# Patient Record
Sex: FEMALE | Race: ASIAN | NOT HISPANIC OR LATINO | Employment: FULL TIME | URBAN - METROPOLITAN AREA
[De-identification: names, ages, dates, MRNs, and addresses within clinical notes are randomized per-mention and may not be internally consistent; named-entity substitution may affect disease eponyms.]

---

## 2023-03-20 NOTE — PROGRESS NOTES
Assessment/Plan:  Calcium 1000 mg + 600-1000 IU Vit D daily  Pap with high risk HPV Annual mammogram, monthly breast self exam    Vulvitis ? Lichens vs yeast/dermatitis RX lotrisone cream bid x 2 weeks then daily x 2 weeks  F/u 1 month for repeat eval possible bx  Vaginal erythema/friability? Atrophy pain with speculum insertion  Diflucan 1 now repeat in 3 days   Re evaluate in 1 month ? Vaginal estrogen        Diagnoses and all orders for this visit:    Encounter for gynecological examination without abnormal finding  -     IGP,CtNg,AptimaHPV,rfx16/18,45    Encounter for screening mammogram for malignant neoplasm of breast  -     Mammo screening bilateral w 3d & cad; Future    Encounter for Papanicolaou smear for cervical cancer screening  -     IGP,CtNg,AptimaHPV,rfx16/18,45    Acute vulvitis  -     clotrimazole-betamethasone (LOTRISONE) 1-0 05 % cream; Twice a day for 2 weeks then daily for 2 weeks    Acute vaginitis  -     fluconazole (DIFLUCAN) 150 mg tablet; Take 1 tablet (150 mg total) by mouth once for 1 dose Repeat in 3 days    Screen for STD (sexually transmitted disease)  -     IGP,CtNg,AptimaHPV,rfx16/18,45    Other orders  -     Liquid-based pap, screening  -     multivitamin (THERAGRAN) TABS; Take 1 tablet by mouth daily          Subjective:      Patient ID: Chapito Nelson is a 53 y o  female  New former pt here for annual gyn Last seen   Last pap 10/2017 neg/neg No h/o abn pap  Has had several issues Painful intercourse the past few years  As well as PCB  She saw [facility] ob gyn last year as thought they were our practice  They did US and EMB she reports were normal US  obtained from [facility]  Uterus normal size inhomogenous echotexture no discrete fibroid  EMS 14 mm Ovaries WNL  They also performed vaginal swabs nuswab and mycoplasma culture per pt neg  No results available for review She notes vaginal dryness  Infrequently SA due to pain    Periods spaced out  LMP 2022  Did have light period/spotting last week  No sex after normal period In Dec   She went to [facility] in Dec and while there developed severe  vulvar itching and burning She denies any discharge  Got worse so tried some Lotrimin without improvement  She had seen PCP in Nov for [facility] blood work done and all normal  She c/o hairloss  Had been under a lot of stress Her dad passed away and other family issues  She  started on a prescription minoxidal   Initially she developed bumps on scalp She continued to use and developed inflammatory response  Swelling of scalp face eyes  She followed up with PCP and was given steroids  She then dev severe migraine vomiting, felt confused went to [address] [place] [address]  Feb 2023 CT head neg  CBC, Chem 12 and TSH all normal Given fluids and IV fluids and meds ( Toradol, Benadryl and Reglan) and felt better  She feels since then things are really off like she is having something autoimmune  She did see  Her PCP again end of Feb for stress reaction and dysthymia They prescribed Zoloft but she has not started as feels she is doing ok  who performed blood work  Will request those results   She had last colonoscopy 2016 for hemorrhoidal bleeding  The following portions of the patient's history were reviewed and updated as appropriate: allergies, current medications, past family history, past medical history, past social history, past surgical history and problem list     Review of Systems   Constitutional: Negative for fatigue and unexpected weight change  Gastrointestinal: Negative for abdominal pain, constipation and diarrhea  Genitourinary: Positive for dyspareunia, menstrual problem, vaginal bleeding and vaginal pain  Negative for difficulty urinating, dysuria, frequency and hematuria          Vulvar itching and burning          Objective:      /72   Ht 5' 4 5" (1 638 m)   Wt 65 8 kg (145 lb)   LMP 12/26/2022 (Approximate) Comment: no coitus since December  Breastfeeding No   BMI 24 50 kg/m² Physical Exam  Vitals and nursing note reviewed  Constitutional:       General: She is not in acute distress  Appearance: Normal appearance  HENT:      Head: Normocephalic and atraumatic  Cardiovascular:      Rate and Rhythm: Normal rate and regular rhythm  Pulmonary:      Effort: Pulmonary effort is normal       Breath sounds: Normal breath sounds  Chest:   Breasts:     Breasts are symmetrical       Right: Normal  No mass, nipple discharge, skin change or tenderness  Left: Normal  No mass, nipple discharge, skin change or tenderness  Abdominal:      General: There is no distension  Palpations: Abdomen is soft  Tenderness: There is no abdominal tenderness  There is no guarding or rebound  Genitourinary:     General: Normal vulva  Exam position: Lithotomy position  Labia:         Right: Rash present  No tenderness, lesion or injury  Left: Rash present  No tenderness, lesion or injury  Urethra: No prolapse, urethral pain, urethral swelling or urethral lesion  Vagina: Erythema and bleeding present  No lesions  Cervix: Friability and cervical bleeding present  No cervical motion tenderness, discharge or lesion  Uterus: Normal        Adnexa: Right adnexa normal and left adnexa normal         Right: No mass or tenderness  Left: No mass or tenderness  Rectum: No mass or external hemorrhoid  Comments: Whitening of skin vulva mons to perianal  Loss of labia minora ? Lichens vs dermatitis  Vagina inflamed/friable/bleeding with exam  Atrophic PAP from cervix A lot of bleeding   Musculoskeletal:         General: Normal range of motion  Lymphadenopathy:      Upper Body:      Right upper body: No axillary adenopathy  Left upper body: No axillary adenopathy  Lower Body: No right inguinal adenopathy  No left inguinal adenopathy  Skin:     General: Skin is warm and dry     Neurological:      Mental Status: She is alert and oriented to person, place, and time  Psychiatric:         Mood and Affect: Mood normal          Behavior: Behavior normal          Thought Content:  Thought content normal          Judgment: Judgment normal

## 2023-03-20 NOTE — PATIENT INSTRUCTIONS
Calcium 1000 mg + 600-1000 IU Vit D daily  Pap with high risk HPV Annual mammogram, monthly breast self exam    Vulvitis ? Lichens vs yeast/dermatitis RX lotrisone cream bid x 2 weeks then daily x 2 weeks  F/u 1 month for repeat eval possible bx  Vaginal erythema/friability? Atrophy pain with speculum insertion  Diflucan 1 now repeat in 3 days   Re evaluate in 1 month ?  Vaginal estrogen

## 2023-03-21 ENCOUNTER — OFFICE VISIT (OUTPATIENT)
Dept: OBGYN CLINIC | Facility: CLINIC | Age: 48
End: 2023-03-21

## 2023-03-21 ENCOUNTER — TELEPHONE (OUTPATIENT)
Dept: OBGYN CLINIC | Facility: CLINIC | Age: 48
End: 2023-03-21

## 2023-03-21 VITALS
SYSTOLIC BLOOD PRESSURE: 110 MMHG | HEIGHT: 65 IN | DIASTOLIC BLOOD PRESSURE: 72 MMHG | WEIGHT: 145 LBS | BODY MASS INDEX: 24.16 KG/M2

## 2023-03-21 DIAGNOSIS — N76.0 ACUTE VAGINITIS: ICD-10-CM

## 2023-03-21 DIAGNOSIS — Z12.31 ENCOUNTER FOR SCREENING MAMMOGRAM FOR MALIGNANT NEOPLASM OF BREAST: ICD-10-CM

## 2023-03-21 DIAGNOSIS — Z01.419 ENCOUNTER FOR GYNECOLOGICAL EXAMINATION WITHOUT ABNORMAL FINDING: Primary | ICD-10-CM

## 2023-03-21 DIAGNOSIS — Z12.4 ENCOUNTER FOR PAPANICOLAOU SMEAR FOR CERVICAL CANCER SCREENING: ICD-10-CM

## 2023-03-21 DIAGNOSIS — Z11.3 SCREEN FOR STD (SEXUALLY TRANSMITTED DISEASE): ICD-10-CM

## 2023-03-21 DIAGNOSIS — N76.2 ACUTE VULVITIS: ICD-10-CM

## 2023-03-21 RX ORDER — CLOTRIMAZOLE AND BETAMETHASONE DIPROPIONATE 10; .64 MG/G; MG/G
CREAM TOPICAL
Qty: 45 G | Refills: 1 | Status: SHIPPED | OUTPATIENT
Start: 2023-03-21

## 2023-03-21 RX ORDER — FLUCONAZOLE 150 MG/1
150 TABLET ORAL ONCE
Qty: 1 TABLET | Refills: 0 | Status: SHIPPED | OUTPATIENT
Start: 2023-03-21 | End: 2023-03-21

## 2023-03-21 RX ORDER — DIPHENOXYLATE HYDROCHLORIDE AND ATROPINE SULFATE 2.5; .025 MG/1; MG/1
1 TABLET ORAL DAILY
COMMUNITY

## 2023-03-21 NOTE — TELEPHONE ENCOUNTER
Neville chester she was in the office today and told she would have 2 doses of fluconazole  She picked up her Rx and only one pill was provided   Please advise

## 2023-03-21 NOTE — TELEPHONE ENCOUNTER
Contacted [facility] pharmacist who states she can add the new order as a verbal but cannot add to prior rx since it has been picked up   See additional patient portal message

## 2023-03-26 LAB
C TRACH RRNA CVX QL NAA+PROBE: NEGATIVE
CYTOLOGIST CVX/VAG CYTO: NORMAL
DX ICD CODE: NORMAL
HPV GENOTYPE REFLEX: NORMAL
HPV I/H RISK 4 DNA CVX QL PROBE+SIG AMP: NEGATIVE
N GONORRHOEA RRNA CVX QL NAA+PROBE: NEGATIVE
OTHER STN SPEC: NORMAL
PATH REPORT.FINAL DX SPEC: NORMAL
SL AMB NOTE:: NORMAL
SL AMB SPECIMEN ADEQUACY: NORMAL
SL AMB TEST METHODOLOGY: NORMAL

## 2023-04-20 ENCOUNTER — OFFICE VISIT (OUTPATIENT)
Dept: OBGYN CLINIC | Facility: CLINIC | Age: 48
End: 2023-04-20

## 2023-04-20 VITALS
BODY MASS INDEX: 25.54 KG/M2 | SYSTOLIC BLOOD PRESSURE: 110 MMHG | DIASTOLIC BLOOD PRESSURE: 70 MMHG | HEIGHT: 64 IN | WEIGHT: 149.6 LBS

## 2023-04-20 DIAGNOSIS — R93.89 THICKENED ENDOMETRIUM: Primary | ICD-10-CM

## 2023-04-20 DIAGNOSIS — N95.2 VAGINAL ATROPHY: ICD-10-CM

## 2023-04-20 DIAGNOSIS — N76.2 ACUTE VULVITIS: ICD-10-CM

## 2023-04-20 RX ORDER — ESTRADIOL 0.1 MG/G
1 CREAM VAGINAL DAILY
Qty: 42.5 G | Refills: 2 | Status: SHIPPED | OUTPATIENT
Start: 2023-04-20

## 2023-04-20 RX ORDER — FLUCONAZOLE 150 MG/1
TABLET ORAL
Qty: 6 TABLET | Refills: 0 | Status: SHIPPED | OUTPATIENT
Start: 2023-04-20 | End: 2023-06-01

## 2023-04-20 RX ORDER — CLOBETASOL PROPIONATE 0.46 MG/ML
SOLUTION TOPICAL
COMMUNITY
Start: 2023-01-12

## 2023-04-20 NOTE — PROGRESS NOTES
Assessment/Plan:  Vulvitis improving continue diflucan 1 tab weekly  Dyspareunia/friable vagina Start Vaginal estrace daily x 14 days then three times per week   F/u 2-3 months   Call with worsening sx     Diagnoses and all orders for this visit:    Thickened endometrium  Comments:  thickened endomet on US last year 14 mm  Having infrequent period will recheck as starting vaginal estrogen   Orders:  -     US pelvis complete w transvaginal; Future    Acute vulvitis  Comments:  improving continue weekly diflucan x 6  Orders:  -     fluconazole (DIFLUCAN) 150 mg tablet; Weekly for 6 weeks    Vaginal atrophy  Comments:  dyspareunia for years friable vagina will start vag estrace  Orders:  -     estradiol (ESTRACE VAGINAL) 0 1 mg/g vaginal cream; Insert 1 g into the vagina daily X 14 days then 3 times per week    Other orders  -     Liquid-based pap, screening  -     clobetasol (TEMOVATE) 0 05 % external solution; APPLY 3X/WEEK TO PSORIASIS ON SCALP AT BEDTIME X 2 WEEKS AS NEEDED FOR FLARES  Subjective:      Patient ID: Alison Albert is a 53 y o  female  Here for f/u to last visit Seen for annual gyn not seen for several years  She had acute vulvitis and  friable vagina with spec insertion  PAP neg/neg No h/o abn Prior pap 2017 neg/neg  She has had very  Painful intercourse the past few years has tried lubricant without improvement Has not had sex in past 6 months  When tries she has a lot of bleeding  She saw [facility] ob gyn last year as thought they were our practice  Due to finding  They did US and EMB she reports were normal US  obtained from [facility]  Uterus normal size inhomogenous echotexture no discrete fibroid  EMS 14 mm Ovaries WNL  They also performed vaginal swabs nuswab and mycoplasma culture for friable vagina all were negative  She notes vaginal dryness  Infrequently SA due to pain  Periods spaced out  LMP 12/26/2022  Did have light period/spotting in March    No sex after her last normal period In Dec  "She went to [facility] in Dec and while there developed severe  vulvar itching and burning She denies any discharge  Got worse so tried some Lotrimin without improvement  She had seen PCP in Nov for [facility] blood work done and all normal  She c/o hairloss  Had been under a lot of stress Her dad passed away and other family issues  She  started on a prescription minoxidal   Initially she developed bumps on scalp She continued to use and developed inflammatory response  Swelling of scalp face eyes  She followed up with PCP and was given steroids  She then dev severe migraine vomiting, felt confused went to [facility]  Feb 2023 CT head neg  CBC, Chem 12 and TSH all normal Given fluids and IV fluids and meds ( Toradol, Benadryl and Reglan) and felt better  She feels since then things are really off like she is having something autoimmune  She did see  Her PCP again end of Feb for stress reaction and dysthymia They prescribed Zoloft but she has not started AT her visit 2 weeks ago she had erythema entire vulva She was given diflucan and lotrisone and states she is feeling much better Her vagina again was noted to be friable pain with speculum insertion       The following portions of the patient's history were reviewed and updated as appropriate: allergies, current medications, past family history, past medical history, past social history, past surgical history and problem list     Review of Systems   Constitutional: Negative for chills and fever  Gastrointestinal: Negative for abdominal pain  Genitourinary: Positive for dyspareunia and menstrual problem  Negative for frequency, pelvic pain, urgency and vaginal discharge  Vulvar itching         Objective:      /70 (BP Location: Left arm, Patient Position: Sitting, Cuff Size: Standard)   Ht 5' 4 25\" (1 632 m)   Wt 67 9 kg (149 lb 9 6 oz)   LMP 12/26/2022 (Exact Date)   BMI 25 48 kg/m²          Physical Exam  Vitals and nursing note reviewed     Constitutional:     " General: She is not in acute distress  Appearance: Normal appearance  HENT:      Head: Normocephalic and atraumatic  Pulmonary:      Effort: Pulmonary effort is normal    Abdominal:      General: There is no distension  Palpations: Abdomen is soft  There is no mass  Tenderness: There is no abdominal tenderness  Genitourinary:     General: Normal vulva  Exam position: Lithotomy position  Labia:         Right: No rash or lesion  Left: No rash or lesion  Vagina: Normal  No vaginal discharge or erythema  Cervix: No cervical motion tenderness, discharge, lesion or cervical bleeding  Uterus: Not enlarged and not tender  Adnexa:         Right: No mass or tenderness  Left: No mass or tenderness  Rectum: Normal       Comments: Less erythema/discoloration Speculum insertion slightly improved less erythema and bleeding   Lymphadenopathy:      Lower Body: No right inguinal adenopathy  No left inguinal adenopathy  Skin:     General: Skin is warm and dry  Neurological:      General: No focal deficit present  Mental Status: She is alert and oriented to person, place, and time  Psychiatric:         Mood and Affect: Mood normal          Behavior: Behavior normal          Thought Content:  Thought content normal

## 2023-04-29 NOTE — PATIENT INSTRUCTIONS
Vulvitis improving continue diflucan 1 tab weekly  Dyspareunia/friable vagina Start Vaginal estrace daily x 14 days then three times per week   F/u 2-3 months   Call with worsening sx

## 2023-05-30 ENCOUNTER — HOSPITAL ENCOUNTER (OUTPATIENT)
Dept: MAMMOGRAPHY | Facility: IMAGING CENTER | Age: 48
Discharge: HOME | End: 2023-05-30

## 2023-05-30 VITALS — BODY MASS INDEX: 24.75 KG/M2 | HEIGHT: 64 IN | WEIGHT: 145 LBS

## 2023-05-30 DIAGNOSIS — Z12.31 ENCOUNTER FOR SCREENING MAMMOGRAM FOR MALIGNANT NEOPLASM OF BREAST: ICD-10-CM

## 2023-08-09 ENCOUNTER — TELEPHONE (OUTPATIENT)
Dept: OBGYN CLINIC | Facility: CLINIC | Age: 48
End: 2023-08-09

## 2023-08-09 NOTE — TELEPHONE ENCOUNTER
Leatrice Klinefelter is having difficulty getting to [facility] for follow up right breast testing. Ewa Sherronsinan wants to get testing done at [facility]. Leatrice Klinefelter will attempt to get right breast testing orders from pt portal. Informed Ewa Sherronsinan to get screening mammogram disc from [facility] to take with her to [facility] testing appt. .Informed to call radiology medical records or main [facility] phone #.. She verbally understood & agreed with plan.

## 2024-08-30 DIAGNOSIS — N76.2 ACUTE VULVITIS: ICD-10-CM

## 2024-08-30 RX ORDER — CLOTRIMAZOLE AND BETAMETHASONE DIPROPIONATE 10; .64 MG/G; MG/G
CREAM TOPICAL
Qty: 45 G | Refills: 1 | Status: SHIPPED | OUTPATIENT
Start: 2024-08-30

## 2024-08-30 NOTE — TELEPHONE ENCOUNTER
Medication: clotrimazole-betamethasone (LOTRISONE) 1-0.05 % cream     Dose/Frequency: Twice a day for 2 weeks then daily for 2 weeks     Quantity: 45 g    Pharmacy: [facility]    Office:   [] PCP/Provider -   [x] Speciality/Provider - LINDA Daugherty     Does the patient have enough for 3 days?   [] Yes   [x] No - Send as HP to POD       Patient asking for refill prescription she had  in August. Patient is set up for yearly visit 24.

## 2024-09-25 ENCOUNTER — ANNUAL EXAM (OUTPATIENT)
Dept: OBGYN CLINIC | Facility: CLINIC | Age: 49
End: 2024-09-25
Payer: COMMERCIAL

## 2024-09-25 VITALS
HEIGHT: 64 IN | SYSTOLIC BLOOD PRESSURE: 126 MMHG | WEIGHT: 151 LBS | DIASTOLIC BLOOD PRESSURE: 74 MMHG | BODY MASS INDEX: 25.78 KG/M2

## 2024-09-25 DIAGNOSIS — R93.89 THICKENED ENDOMETRIUM: ICD-10-CM

## 2024-09-25 DIAGNOSIS — N92.6 IRREGULAR MENSES: ICD-10-CM

## 2024-09-25 DIAGNOSIS — R35.0 URINARY FREQUENCY: ICD-10-CM

## 2024-09-25 DIAGNOSIS — N64.4 BREAST PAIN, LEFT: ICD-10-CM

## 2024-09-25 DIAGNOSIS — R92.8 ABNORMALITY OF RIGHT BREAST ON SCREENING MAMMOGRAM: ICD-10-CM

## 2024-09-25 DIAGNOSIS — Z87.440 HISTORY OF UTI: ICD-10-CM

## 2024-09-25 DIAGNOSIS — N95.2 VAGINAL ATROPHY: ICD-10-CM

## 2024-09-25 DIAGNOSIS — Z01.419 ENCOUNTER FOR GYNECOLOGICAL EXAMINATION WITHOUT ABNORMAL FINDING: Primary | ICD-10-CM

## 2024-09-25 DIAGNOSIS — Z12.31 ENCOUNTER FOR SCREENING MAMMOGRAM FOR MALIGNANT NEOPLASM OF BREAST: ICD-10-CM

## 2024-09-25 PROCEDURE — 99214 OFFICE O/P EST MOD 30 MIN: CPT | Performed by: NURSE PRACTITIONER

## 2024-09-25 PROCEDURE — 99396 PREV VISIT EST AGE 40-64: CPT | Performed by: NURSE PRACTITIONER

## 2024-09-25 RX ORDER — ESTRADIOL 0.1 MG/G
1 CREAM VAGINAL DAILY
Qty: 42.5 G | Refills: 3 | Status: SHIPPED | OUTPATIENT
Start: 2024-09-25

## 2024-09-25 RX ORDER — CEFUROXIME AXETIL 500 MG/1
TABLET ORAL
COMMUNITY
Start: 2024-09-01

## 2024-09-25 RX ORDER — FEXOFENADINE HCL 180 MG/1
180 TABLET ORAL DAILY
COMMUNITY

## 2024-09-25 RX ORDER — FLUCONAZOLE 150 MG/1
TABLET ORAL
COMMUNITY
Start: 2024-09-01

## 2024-09-25 NOTE — PROGRESS NOTES
Assessment/Plan:  Calcium 1000 mg + 600-1000 IU Vit D daily. Pap with high risk HPV Q 5 years   Abnormal mammogram last year for which f/u DX mammo and US recommended Not done  Will get diagnostic bilat mammo now as due for routine mammo and US right breast as prev recommended, monthly breast self exam  Vaginal atrophy/friable vagina/dyspareunia improved from prior stopped vag estrogen now becoming symptomatic again to restart Vag estrace.  Irreg menses thinks went a year without and got again 9/12/2024 Will check labs and f/u US to check endometrial stripe          Diagnoses and all orders for this visit:    Encounter for gynecological examination without abnormal finding    Encounter for screening mammogram for malignant neoplasm of breast  -     Mammo screening bilateral w 3d and cad; Future    Breast pain, left  -     Mammo diagnostic bilateral w 3d and cad; Future  -     US breast left limited (diagnostic); Future    Thickened endometrium  -     US pelvis complete w transvaginal; Future    Abnormality of right breast on screening mammogram  -     Mammo diagnostic bilateral w 3d and cad; Future  -     US breast right limited (diagnostic); Future    Irregular menses  -     US pelvis complete w transvaginal; Future  -     FSH and LH; Future  -     TSH, 3rd generation with Free T4 reflex; Future  -     Prolactin; Future  -     ESTRADIOL, FREE SERUM; Future  -     FSH and LH  -     TSH, 3rd generation with Free T4 reflex  -     Prolactin  -     ESTRADIOL, FREE SERUM    Vaginal atrophy  -     estradiol (ESTRACE VAGINAL) 0.1 mg/g vaginal cream; Insert 1 g into the vagina daily X 14 days then 3 times per week    History of UTI  -     UA/M w/rflx Culture, Routine    Urinary frequency  -     UA/M w/rflx Culture, Routine    Vaginal atrophy  Comments:  dyspareunia for years friable vagina improved with vag estrace  Orders:  -     estradiol (ESTRACE VAGINAL) 0.1 mg/g vaginal cream; Insert 1 g into the vagina daily X 14 days  then 3 times per week    Other orders  -     Multiple Vitamins-Minerals (MULTIVITAMIN ADULT, MINERALS, PO); Take 1 tablet by mouth daily  -     cefuroxime (CEFTIN) 500 mg tablet; take 1 tablet by mouth every 12 hours for 10 days (Patient not taking: Reported on 2024)  -     fexofenadine (ALLEGRA) 180 MG tablet; Take 180 mg by mouth daily  -     fluconazole (DIFLUCAN) 150 mg tablet; TAKE 1 TABLET BY MOUTH ONCE  -     Microscopic Examination          Subjective:      Patient ID: Neville Bender is a 49 y.o. female.     Last pap 3/21/2023 neg/neg HPV prior 10/2017 neg/neg No h/o abn pap. Has had several issues Painful intercourse the past few years. As well as PCB. She saw [facility] ob gyn in  thought they were our practice. They did US and EMB she reports were normal US obtained from [facility]. Uterus normal size inhomogenous echotexture no discrete fibroid. EMS 14 mm Ovaries WNL She was recommended repeat US last year and not done She had friable vaginal and they also performed vaginal swabs nuswab and mycoplasma culture per pt results neg. When she was seen last by me 3/2023 she was prescribed Vaginal estrace which helped However then stopped using and intercourse painful again.  Infrequently SA due to pain. H/o  Irreg periods Periods spaced out LMP 2022 then 3/2023 She did have period 2024 normal period unsure when prior was thinks 1 year.  She had seen PCP in 2022 for [place] blood work done and all normal She c/o hairloss Had been under a lot of stress Her dad passed away and other family issues. She started on a prescription minoxidal. Initially she developed bumps on scalp She continued to use and developed inflammatory response Swelling of scalp face eyes She followed up with PCP and was given steroids. She then dev severe migraine vomiting, felt confused went to [facility] ER.2023 CT head neg. CBC, Chem 12 and TSH all rayo At last visit was recommendedf/u US Prior EMS 14 mm no results States she did not  "get Also had abn mammo 5/30/2023 right breast asymmetry Right breast US and diagnostic mammo ordered no results in chart Did not get. She is c/o persistent left breast pain ? Hormonal She also was treated for UTI recently still having some frequency and urgency unsure if better           The following portions of the patient's history were reviewed and updated as appropriate: allergies, current medications, past family history, past medical history, past social history, past surgical history, and problem list.    Review of Systems   Constitutional:  Negative for fatigue and unexpected weight change.   Gastrointestinal:  Negative for abdominal distention, abdominal pain, constipation and diarrhea.   Genitourinary:  Positive for dyspareunia, frequency, menstrual problem (irregular) and urgency. Negative for difficulty urinating, dysuria, genital sores, pelvic pain, vaginal bleeding, vaginal discharge and vaginal pain.   Neurological:  Negative for headaches.   Psychiatric/Behavioral: Negative.  Negative for dysphoric mood. The patient is not nervous/anxious.          Objective:      /74 (BP Location: Left arm, Patient Position: Sitting, Cuff Size: Standard)   Ht 5' 4.25\" (1.632 m)   Wt 68.5 kg (151 lb)   LMP 09/12/2024 Comment: almost went a year without a period and then got a full 5 day period on 9/12.  BMI 25.72 kg/m²          Physical Exam  Vitals and nursing note reviewed.   Constitutional:       General: She is not in acute distress.     Appearance: Normal appearance.   HENT:      Head: Normocephalic and atraumatic.   Pulmonary:      Effort: Pulmonary effort is normal.   Chest:   Breasts:     Breasts are symmetrical.      Right: Normal. No mass, nipple discharge, skin change or tenderness.      Left: Normal. No mass, nipple discharge, skin change or tenderness.   Abdominal:      General: There is no distension.      Palpations: Abdomen is soft.      Tenderness: There is no abdominal tenderness. There " is no guarding or rebound.   Genitourinary:     General: Normal vulva.      Exam position: Lithotomy position.      Labia:         Right: No rash, tenderness, lesion or injury.         Left: No rash, tenderness, lesion or injury.       Urethra: No prolapse, urethral pain, urethral swelling or urethral lesion.      Vagina: Normal. No erythema or lesions.      Cervix: No cervical motion tenderness, discharge, lesion or cervical bleeding.      Uterus: Normal.       Adnexa: Right adnexa normal and left adnexa normal.        Right: No mass or tenderness.          Left: No mass or tenderness.        Rectum: No mass or external hemorrhoid.      Comments: Atrophic mucosa spotting with speculum improved from previous   Musculoskeletal:         General: Normal range of motion.   Lymphadenopathy:      Upper Body:      Right upper body: No axillary adenopathy.      Left upper body: No axillary adenopathy.      Lower Body: No right inguinal adenopathy. No left inguinal adenopathy.   Skin:     General: Skin is warm and dry.   Neurological:      Mental Status: She is alert and oriented to person, place, and time.   Psychiatric:         Mood and Affect: Mood normal.         Behavior: Behavior normal.         Thought Content: Thought content normal.         Judgment: Judgment normal.

## 2024-09-26 LAB
APPEARANCE UR: CLEAR
BACTERIA URNS QL MICRO: NORMAL
BILIRUB UR QL STRIP: NEGATIVE
CASTS URNS QL MICRO: NORMAL /LPF
COLOR UR: YELLOW
EPI CELLS #/AREA URNS HPF: NORMAL /HPF (ref 0–10)
GLUCOSE UR QL: NEGATIVE
HGB UR QL STRIP: NEGATIVE
KETONES UR QL STRIP: NEGATIVE
LEUKOCYTE ESTERASE UR QL STRIP: NEGATIVE
MICRO URNS: NORMAL
MICRO URNS: NORMAL
NITRITE UR QL STRIP: NEGATIVE
PH UR STRIP: 6.5 [PH] (ref 5–7.5)
PROT UR QL STRIP: NORMAL
RBC #/AREA URNS HPF: NORMAL /HPF (ref 0–2)
SL AMB URINALYSIS REFLEX: NORMAL
SP GR UR: 1.02 (ref 1–1.03)
UROBILINOGEN UR STRIP-ACNC: 0.2 MG/DL (ref 0.2–1)
WBC #/AREA URNS HPF: NORMAL /HPF (ref 0–5)

## 2024-10-03 NOTE — PATIENT INSTRUCTIONS
Calcium 1000 mg + 600-1000 IU Vit D daily. Pap with high risk HPV Q 5 years   Abnormal mammogram last year for which f/u DX mammo and US recommended Not done  Will get diagnostic bilat mammo now as due for routine mammo and US right breast as prev recommended and US left breast due to c/o persistent breast pain monthly breast self exam  Vaginal atrophy/friable vagina/dyspareunia improved from prior stopped vag estrogen now becoming symptomatic again to restart Vag estrace.  Irreg menses thinks went a year without and got again 9/12/2024 Will check labs and f/u US to check endometrial stripe

## 2024-11-21 ENCOUNTER — RESULTS FOLLOW-UP (OUTPATIENT)
Dept: OBGYN CLINIC | Facility: CLINIC | Age: 49
End: 2024-11-21

## 2024-11-25 ENCOUNTER — OFFICE VISIT (OUTPATIENT)
Dept: OBGYN CLINIC | Facility: CLINIC | Age: 49
End: 2024-11-25
Payer: COMMERCIAL

## 2024-11-25 VITALS — SYSTOLIC BLOOD PRESSURE: 120 MMHG | DIASTOLIC BLOOD PRESSURE: 74 MMHG

## 2024-11-25 DIAGNOSIS — N93.9 VAGINAL BLEEDING: ICD-10-CM

## 2024-11-25 DIAGNOSIS — N94.9 VAGINAL DISCOMFORT: Primary | ICD-10-CM

## 2024-11-25 DIAGNOSIS — N88.9 LESION OF CERVIX: ICD-10-CM

## 2024-11-25 DIAGNOSIS — N95.2 VAGINAL ATROPHY: ICD-10-CM

## 2024-11-25 DIAGNOSIS — Z12.4 ENCOUNTER FOR PAPANICOLAOU SMEAR FOR CERVICAL CANCER SCREENING: ICD-10-CM

## 2024-11-25 DIAGNOSIS — N89.5 VAGINAL STENOSIS: ICD-10-CM

## 2024-11-25 DIAGNOSIS — Z11.3 SCREEN FOR STD (SEXUALLY TRANSMITTED DISEASE): ICD-10-CM

## 2024-11-25 PROCEDURE — 99214 OFFICE O/P EST MOD 30 MIN: CPT | Performed by: NURSE PRACTITIONER

## 2024-11-25 NOTE — PATIENT INSTRUCTIONS
On going issue with vaginal discomfort unable to have intercourse w/ h/o Irreg periods and fertility issues with IVF Initially thought due to poss atrophy vs some type of inflammatory response ( Kong Busby like)   Vag cultures 2021 neg. PAP 2023 neg/neg HPV Done again today   Prev improvement with estrace cream although never back to baseline. She has used intermittently the past year  Recent US 6 mm stripe Labs confirmed normal FSH/LH not menopausal. Discussed resuming estrace However requesting pt be examined by physician for second opinion Def seems some degree of agglutination of vagina   ?Cervical lesion on US unable to adequately visualize cervix  PAP obtained

## 2024-11-25 NOTE — PROGRESS NOTES
Assessment/Plan:  On going issue with vaginal discomfort unable to have intercourse w/ h/o Irreg periods and fertility issues with IVF Initially thought due to poss atrophy vs some type of inflammatory response ( Kong Busby like)   Vag cultures  neg. PAP  neg/neg HPV Done again today   Prev improvement with estrace cream although never back to baseline. She has used intermittently the past year  Recent US 6 mm stripe Labs confirmed normal FSH/LH not menopausal. Although periods have always been irreg.  To track cycles. Discussed resuming estrace However prefer she be examined by physician for second opinion Def seems some degree of agglutination of vagina erythema     ?Cervical lesion on US unable to adequately visualize cervix  PAP obtained NO h/o abn pap and prior 3/2023 neg/neg HPV          Diagnoses and all orders for this visit:    Vaginal discomfort    Vaginal atrophy    Lesion of cervix    Vaginal bleeding    Vaginal stenosis    Encounter for Papanicolaou smear for cervical cancer screening  -     IGP,CtNg,AptimaHPV,rfx16/18,45    Screen for STD (sexually transmitted disease)  -     IGP,CtNg,AptimaHPV,rfx16/18,45          Subjective:      Patient ID: Neville eBnder is a 49 y.o. female.    Here for f/u with ongoing c/o vaginal discomfort feels like something in the vagina  Last seen 2024 for [facility]  Last pap 3/21/2023 neg/neg HPV prior 10/2017 neg/neg No h/o abn pap. Has had several issues Painful intercourse the past few years. As well as PCB. She saw [facility] ob gyn in  thought they were our practice. They did US and EMB she reports were normal US obtained from [facility]. Uterus normal size inhomogenous echotexture no discrete fibroid. EMS 14 mm Ovaries WNL She was recommended repeat US last year and not done She had friable vaginal and they also performed vaginal swabs nuswab and mycoplasma culture per pt results neg. When she was  by me 3/2023 she was prescribed Vaginal estrace which helped however  never normal She then stopped using and reported at her last visit intercourse painful again recommended restarting which she has used intermittently since then. She has a h/o infertility with IVF and notes periods had always been irregular.  Periods spaced out LMP 12/26/2022 then 3/2023 She did have period 9/12/2024 normal period unsure when prior was thought over a year.  She c/o hairloss Had been under a lot of stress Her dad passed away and other family issues. She started on a prescription minoxidal in 2023. Initially she developed bumps on scalp She continued to use and developed inflammatory response with Swelling of scalp face eyes She developed hives severe headache. She followed up with PCP and was given steroids. She then dev severe migraine vomiting, felt confused went to [facility] ER.Feb 2023 CT head neg. CBC, Chem 12 and TSH all normal  She notes since that episode she has not felt normal since.  Unsure if COVID vaccine has played a role as well as that is when vaginal symptoms originally started. She was recommended f/u US in 2023 which she did not get and was ordered again at [facility] in Sept Labs ordered to check Menopause staus Never received but lab called and report FSH 5.2 Requested be faxed  US 11/8/2024  EMS 6 mm normal uterus There is a 2 cm hypoechoic avascular lesion of unknow sig favors benign poss complex nabothian cyst  She states she had a lot of vaginal bleeding following US She is here for f/u still feeling like something in the vagina. UA 9/2024 neg She had been treated prev for UTI and was still having some frequency and urgency. Also had abn mammo 5/30/2023 right breast asymmetry Right breast US and diagnostic mammo ordered which she did not get. Reordered at [facility] and will schedule soon.         The following portions of the patient's history were reviewed and updated as appropriate: allergies, current medications, past family history, past medical history, past social history, past surgical  history, and problem list.    Review of Systems   Constitutional:  Negative for chills and fever.   Gastrointestinal:  Negative for abdominal pain, constipation and diarrhea.   Genitourinary:  Positive for dyspareunia, vaginal bleeding and vaginal pain.         Objective:      /74 (BP Location: Right arm, Patient Position: Sitting, Cuff Size: Standard)   LMP 09/11/2024          Physical Exam  Vitals and nursing note reviewed.   Constitutional:       General: She is not in acute distress.     Appearance: Normal appearance.   HENT:      Head: Normocephalic and atraumatic.   Pulmonary:      Effort: Pulmonary effort is normal.   Abdominal:      General: There is no distension.      Palpations: Abdomen is soft. There is no mass.      Tenderness: There is no abdominal tenderness.   Genitourinary:     General: Normal vulva.      Exam position: Lithotomy position.      Labia:         Right: No rash or lesion.         Left: No rash or lesion.       Vagina: Normal. No vaginal discharge or erythema.      Cervix: No cervical motion tenderness, discharge, lesion or cervical bleeding.      Uterus: Not enlarged and not tender.       Adnexa:         Right: No mass or tenderness.          Left: No mass or tenderness.        Rectum: Normal.      Comments: Vagian erythema/atrophic stenotic difficulty opening spec to see cervix due to discomfort PAP obtained Bimanual exam cervix palpates normal Band of scar tissue posterior vagina  Moderate bleeding with exam   Lymphadenopathy:      Lower Body: No right inguinal adenopathy. No left inguinal adenopathy.   Skin:     General: Skin is warm and dry.   Neurological:      General: No focal deficit present.      Mental Status: She is alert and oriented to person, place, and time.   Psychiatric:         Mood and Affect: Mood normal.         Behavior: Behavior normal.         Thought Content: Thought content normal.

## 2024-11-26 ENCOUNTER — OFFICE VISIT (OUTPATIENT)
Dept: OBGYN CLINIC | Facility: CLINIC | Age: 49
End: 2024-11-26
Payer: COMMERCIAL

## 2024-11-26 VITALS
SYSTOLIC BLOOD PRESSURE: 120 MMHG | BODY MASS INDEX: 25.61 KG/M2 | WEIGHT: 150 LBS | DIASTOLIC BLOOD PRESSURE: 72 MMHG | HEIGHT: 64 IN

## 2024-11-26 DIAGNOSIS — N89.5 VAGINAL BAND: Primary | ICD-10-CM

## 2024-11-26 DIAGNOSIS — N95.2 VAGINAL ATROPHY: ICD-10-CM

## 2024-11-26 PROCEDURE — 99213 OFFICE O/P EST LOW 20 MIN: CPT | Performed by: OBSTETRICS & GYNECOLOGY

## 2024-11-26 NOTE — PATIENT INSTRUCTIONS
[email or web address] for the dilators  Continue with the estradiol cream 1 gm twice weekly  Lubricants to consider - Replens, Astroglide or coconut oil. [employer] Lube is available on line.

## 2024-11-26 NOTE — PROGRESS NOTES
Assessment/Plan:    Vaginal band  50 yo  s/p 3 SVDs, one with fourth degree laceration here for opinion re: dyspareunia, coital bleeding.   Dyspareunia precludes coitus past 18 months.   Reviewed u/s from 24 at [facility] with AV uterus 7.3 cms, EMS 6 mm. 1.9 cm cervical Nabothian cyst. R ov 2.4 and L ov 2.6 cm, no free fluid.    Exam with generous introitus, normal atrophic vaginal mucosa, nonfriable, normal cervix. Tight band noted approx 4 cm from introitus. Tender on exam.   Recommend estradiol cream twice weekly (reassurance given, should continue) and dilator therapy. Website information given.   Nuswab vaginitis + cultures done, will contact with results.       Diagnoses and all orders for this visit:    Vaginal atrophy  -     NuSwab Vaginitis Plus (VG+)    Vaginal band          Subjective:      Patient ID: Neville Bender is a 49 y.o. female.    HPI here for vaginal concerns    The following portions of the patient's history were reviewed and updated as appropriate: She  has a past medical history of Asthma (1986), H/O mammogram, and Infertility, female.  She   Patient Active Problem List    Diagnosis Date Noted    Vaginal band 2024    Vaginal atrophy 2024     She  has a past surgical history that includes Reduction mammaplasty and Colonoscopy.  Her family history includes Asthma in her father; Brain cancer in her maternal aunt; Colon cancer in her maternal aunt and maternal uncle; Diabetes in her father and mother; No Known Problems in her maternal aunt, maternal aunt, and maternal aunt; Prostate cancer (age of onset: 68) in her father.  She  reports that she has never smoked. She has never been exposed to tobacco smoke. She has never used smokeless tobacco. She reports that she does not drink alcohol and does not use drugs.  Current Outpatient Medications   Medication Sig Dispense Refill    fexofenadine (ALLEGRA) 180 MG tablet Take 180 mg by mouth daily      Multiple Vitamins-Minerals  "(MULTIVITAMIN ADULT, MINERALS, PO) Take 1 tablet by mouth daily       No current facility-administered medications for this visit.     She is allergic to minoxidil..    Review of Systems  +dyspareunia, coital bleeding    Objective:    /72 (BP Location: Left arm, Patient Position: Sitting, Cuff Size: Standard)   Ht 5' 4.25\" (1.632 m)   Wt 68 kg (150 lb)   LMP 09/11/2024   BMI 25.55 kg/m²      Physical Exam    General appearance: no distress, pleasant  Pelvic exam: normal atrophic external genitalia, urethral meatus normal, generous introitus, vagina atrophic with constriction band noted approx 4 cm from introitus, no vaginal friability, cervix atrophic without lesions, uterus small, non tender, no adnexal masses, non tender  Rectal exam: deferred  "

## 2024-11-26 NOTE — ASSESSMENT & PLAN NOTE
48 yo  s/p 3 SVDs, one with fourth degree laceration here for opinion re: dyspareunia, coital bleeding.   Dyspareunia precludes coitus past 18 months.   Reviewed u/s from 24 at [facility] with AV uterus 7.3 cms, EMS 6 mm. 1.9 cm cervical Nabothian cyst. R ov 2.4 and L ov 2.6 cm, no free fluid.    Exam with generous introitus, normal atrophic vaginal mucosa, nonfriable, normal cervix. Tight band noted approx 4 cm from introitus. Tender on exam.   Recommend estradiol cream twice weekly (reassurance given, should continue) and dilator therapy. Website information given.   Nuswab vaginitis + cultures done, will contact with results.

## 2024-11-26 NOTE — LETTER
2024     LINDA Daugherty  [address]  [address]  [facility] [address]    Patient: Neville Bender   YOB: 1975   Date of Visit: 2024       Thao, I saw Neville. I think her band is due to some trauma. Estrogen cream and dilators should help. If not, I would consider OR release but scarring could always re-form.   She's so nice!        Leidy Levin MD        CC: No Recipients    Leidy Levin MD  2024 12:12 PM  Sign when Signing Visit  Assessment/Plan:    Vaginal band  50 yo  s/p 3 SVDs, one with fourth degree laceration here for opinion re: dyspareunia, coital bleeding.   Dyspareunia precludes coitus past 18 months.   Reviewed u/s from 24 at [facility] with AV uterus 7.3 cms, EMS 6 mm. 1.9 cm cervical Nabothian cyst. R ov 2.4 and L ov 2.6 cm, no free fluid.    Exam with generous introitus, normal atrophic vaginal mucosa, nonfriable, normal cervix. Tight band noted approx 4 cm from introitus. Tender on exam.   Recommend estradiol cream twice weekly (reassurance given, should continue) and dilator therapy. Website information given.   Nuswab vaginitis + cultures done, will contact with results.       Diagnoses and all orders for this visit:    Vaginal atrophy  -     NuSwab Vaginitis Plus (VG+)    Vaginal band          Subjective:      Patient ID: Neville Bender is a 49 y.o. female.    HPI here for vaginal concerns    The following portions of the patient's history were reviewed and updated as appropriate: She  has a past medical history of Asthma (1986), H/O mammogram, and Infertility, female.  She   Patient Active Problem List    Diagnosis Date Noted   • Vaginal band 2024   • Vaginal atrophy 2024     She  has a past surgical history that includes Reduction mammaplasty and Colonoscopy.  Her family history includes Asthma in her father; Brain cancer in her maternal aunt; Colon cancer in her maternal aunt and maternal uncle; Diabetes in her father and mother; No Known Problems  "in her maternal aunt, maternal aunt, and maternal aunt; Prostate cancer (age of onset: 68) in her father.  She  reports that she has never smoked. She has never been exposed to tobacco smoke. She has never used smokeless tobacco. She reports that she does not drink alcohol and does not use drugs.  Current Outpatient Medications   Medication Sig Dispense Refill   • fexofenadine (ALLEGRA) 180 MG tablet Take 180 mg by mouth daily     • Multiple Vitamins-Minerals (MULTIVITAMIN ADULT, MINERALS, PO) Take 1 tablet by mouth daily       No current facility-administered medications for this visit.     She is allergic to minoxidil..    Review of Systems  +dyspareunia, coital bleeding    Objective:    /72 (BP Location: Left arm, Patient Position: Sitting, Cuff Size: Standard)   Ht 5' 4.25\" (1.632 m)   Wt 68 kg (150 lb)   LMP 09/11/2024   BMI 25.55 kg/m²      Physical Exam    General appearance: no distress, pleasant  Pelvic exam: normal atrophic external genitalia, urethral meatus normal, generous introitus, vagina atrophic with constriction band noted approx 4 cm from introitus, no vaginal friability, cervix atrophic without lesions, uterus small, non tender, no adnexal masses, non tender  Rectal exam: deferred  "

## 2024-11-29 LAB
A VAGINAE DNA VAG QL NAA+PROBE: NORMAL SCORE
BVAB2 DNA VAG QL NAA+PROBE: NORMAL SCORE
C ALBICANS DNA VAG QL NAA+PROBE: NEGATIVE
C GLABRATA DNA VAG QL NAA+PROBE: NEGATIVE
C TRACH RRNA SPEC QL NAA+PROBE: NEGATIVE
MEGA1 DNA VAG QL NAA+PROBE: NORMAL SCORE
N GONORRHOEA RRNA SPEC QL NAA+PROBE: NEGATIVE
T VAGINALIS RRNA SPEC QL NAA+PROBE: NEGATIVE

## 2024-12-02 ENCOUNTER — RESULTS FOLLOW-UP (OUTPATIENT)
Dept: OBGYN CLINIC | Facility: CLINIC | Age: 49
End: 2024-12-02

## 2024-12-04 ENCOUNTER — RESULTS FOLLOW-UP (OUTPATIENT)
Dept: OBGYN CLINIC | Facility: CLINIC | Age: 49
End: 2024-12-04

## 2024-12-04 LAB
C TRACH RRNA CVX QL NAA+PROBE: NEGATIVE
CYTOLOGIST CVX/VAG CYTO: NORMAL
DX ICD CODE: NORMAL
ESTRADIOL FREE MFR SERPL: 2 %
ESTRADIOL FREE SERPL-MCNC: 0.14 PG/ML
ESTRADIOL SERPL HS-MCNC: 6.8 PG/ML
FSH SERPL-ACNC: 51.4 MIU/ML
HPV GENOTYPE REFLEX: NORMAL
HPV I/H RISK 4 DNA CVX QL PROBE+SIG AMP: NEGATIVE
LH SERPL-ACNC: 25.5 MIU/ML
N GONORRHOEA RRNA CVX QL NAA+PROBE: NEGATIVE
OTHER STN SPEC: NORMAL
PATH REPORT.FINAL DX SPEC: NORMAL
PATHOLOGIST CVX/VAG CYTO: NORMAL
PROLACTIN SERPL-MCNC: 4 NG/ML (ref 4.8–33.4)
RECOM F/U CVX/VAG CYTO: NORMAL
SL AMB NOTE:: NORMAL
SL AMB SPECIMEN ADEQUACY: NORMAL
SL AMB TEST METHODOLOGY: NORMAL
TSH SERPL DL<=0.005 MIU/L-ACNC: 1.79 UIU/ML (ref 0.45–4.5)

## 2024-12-09 ENCOUNTER — TELEPHONE (OUTPATIENT)
Dept: OBGYN CLINIC | Facility: CLINIC | Age: 49
End: 2024-12-09

## 2024-12-09 NOTE — TELEPHONE ENCOUNTER
Please call with unread [employer] message. Thanks.    Anurag Lozada. Your (vaginal) cultures were all normal (negative).  Let me know if you have any questions.

## 2024-12-12 ENCOUNTER — RESULTS FOLLOW-UP (OUTPATIENT)
Dept: OBGYN CLINIC | Facility: CLINIC | Age: 49
End: 2024-12-12

## 2025-08-12 ENCOUNTER — NURSE TRIAGE (OUTPATIENT)
Age: 50
End: 2025-08-12

## 2025-08-12 ENCOUNTER — OFFICE VISIT (OUTPATIENT)
Age: 50
End: 2025-08-12
Payer: COMMERCIAL